# Patient Record
Sex: MALE | Race: WHITE | NOT HISPANIC OR LATINO | URBAN - METROPOLITAN AREA
[De-identification: names, ages, dates, MRNs, and addresses within clinical notes are randomized per-mention and may not be internally consistent; named-entity substitution may affect disease eponyms.]

---

## 2017-03-16 ENCOUNTER — FOLLOW UP ESTABLISHED (OUTPATIENT)
Dept: URBAN - METROPOLITAN AREA CLINIC 51 | Facility: CLINIC | Age: 57
End: 2017-03-16
Payer: OTHER GOVERNMENT

## 2017-03-16 DIAGNOSIS — H40.1131 PRIMARY OPEN-ANGLE GLAUCOMA, MILD STAGE, BILATERAL: Primary | ICD-10-CM

## 2017-03-16 PROCEDURE — 92083 EXTENDED VISUAL FIELD XM: CPT | Performed by: OPHTHALMOLOGY

## 2017-03-16 PROCEDURE — 92250 FUNDUS PHOTOGRAPHY W/I&R: CPT | Performed by: OPHTHALMOLOGY

## 2017-03-16 PROCEDURE — 92012 INTRM OPH EXAM EST PATIENT: CPT | Performed by: OPHTHALMOLOGY

## 2017-03-16 RX ORDER — LATANOPROST 50 UG/ML
0.005 % SOLUTION OPHTHALMIC
Qty: 3 | Refills: 3 | Status: ACTIVE
Start: 2017-03-16

## 2017-03-16 RX ORDER — TIMOLOL MALEATE 5 MG/ML
0.5 % SOLUTION/ DROPS OPHTHALMIC
Qty: 3 | Refills: 3 | Status: ACTIVE
Start: 2017-03-16

## 2017-03-16 ASSESSMENT — INTRAOCULAR PRESSURE
OD: 16
OS: 17

## 2020-10-12 ENCOUNTER — APPOINTMENT (RX ONLY)
Dept: URBAN - METROPOLITAN AREA CLINIC 63 | Facility: CLINIC | Age: 60
Setting detail: DERMATOLOGY
End: 2020-10-12

## 2020-10-12 DIAGNOSIS — D485 NEOPLASM OF UNCERTAIN BEHAVIOR OF SKIN: ICD-10-CM

## 2020-10-12 PROBLEM — D48.5 NEOPLASM OF UNCERTAIN BEHAVIOR OF SKIN: Status: ACTIVE | Noted: 2020-10-12

## 2020-10-12 PROCEDURE — ? BIOPSY BY SHAVE METHOD

## 2020-10-12 PROCEDURE — 11102 TANGNTL BX SKIN SINGLE LES: CPT

## 2020-10-12 ASSESSMENT — LOCATION SIMPLE DESCRIPTION DERM: LOCATION SIMPLE: RIGHT THIGH

## 2020-10-12 ASSESSMENT — LOCATION ZONE DERM: LOCATION ZONE: LEG

## 2020-10-12 ASSESSMENT — LOCATION DETAILED DESCRIPTION DERM: LOCATION DETAILED: RIGHT ANTERIOR MEDIAL PROXIMAL THIGH

## 2020-10-12 NOTE — PROCEDURE: MIPS QUALITY
Detail Level: Detailed
Quality 110: Preventive Care And Screening: Influenza Immunization: Influenza Immunization previously received during influenza season
3